# Patient Record
Sex: MALE | Race: OTHER | NOT HISPANIC OR LATINO | URBAN - METROPOLITAN AREA
[De-identification: names, ages, dates, MRNs, and addresses within clinical notes are randomized per-mention and may not be internally consistent; named-entity substitution may affect disease eponyms.]

---

## 2020-03-06 ENCOUNTER — EMERGENCY (EMERGENCY)
Facility: HOSPITAL | Age: 25
LOS: 1 days | Discharge: HOME | End: 2020-03-06
Attending: EMERGENCY MEDICINE
Payer: SELF-PAY

## 2020-03-06 VITALS
TEMPERATURE: 98 F | RESPIRATION RATE: 18 BRPM | HEART RATE: 97 BPM | DIASTOLIC BLOOD PRESSURE: 65 MMHG | OXYGEN SATURATION: 98 % | SYSTOLIC BLOOD PRESSURE: 121 MMHG

## 2020-03-06 VITALS
TEMPERATURE: 98 F | SYSTOLIC BLOOD PRESSURE: 146 MMHG | DIASTOLIC BLOOD PRESSURE: 75 MMHG | RESPIRATION RATE: 16 BRPM | OXYGEN SATURATION: 98 % | HEART RATE: 84 BPM

## 2020-03-06 PROCEDURE — 99285 EMERGENCY DEPT VISIT HI MDM: CPT | Mod: 25

## 2020-03-06 PROCEDURE — 99284 EMERGENCY DEPT VISIT MOD MDM: CPT

## 2020-03-06 PROCEDURE — 99053 MED SERV 10PM-8AM 24 HR FAC: CPT

## 2020-03-06 PROCEDURE — 96372 THER/PROPH/DIAG INJ SC/IM: CPT

## 2020-03-06 RX ORDER — HALOPERIDOL DECANOATE 100 MG/ML
5 INJECTION INTRAMUSCULAR ONCE
Refills: 0 | Status: COMPLETED | OUTPATIENT
Start: 2020-03-06 | End: 2020-03-06

## 2020-03-06 RX ADMIN — HALOPERIDOL DECANOATE 5 MILLIGRAM(S): 100 INJECTION INTRAMUSCULAR at 02:20

## 2020-03-06 NOTE — ED ADULT NURSE NOTE - OBJECTIVE STATEMENT
Patient states his name is Arturo Cerna. EMS picked patient up at a [facility] [employer]. Patient found to be extremely intoxicated. Patient is continuing to try to get out of bed. Medication given per MD order and constant observation requested.

## 2020-03-06 NOTE — ED PROVIDER NOTE - NSFOLLOWUPINSTRUCTIONS_ED_ALL_ED_FT
YOU WERE SEEN IN THE ED FOR: INTOXICATION    PLEASE FOLLOW UP WITH YOUR PRIVATE PHYSICIAN WITHIN THE NEXT 72 HOURS. BRING COPIES OF YOUR RESULTS.  -If you do not have a PMD, please call [phone] to find one convenient for you or call our clinic at [phone].    RETURN TO THE EMERGENCY DEPARTMENT IF YOU EXPERIENCE ANY NEW/CONCERNING/WORSENING SYMPTOMS SUCH AS BUT NOT LIMITED TO: HEADACHE, CHEST PAIN, SHORTNESS OF BREATH, ABDOMINAL PAIN, INTRACTABLE VOMITING OR DIARRHEA, LARGE AMOUNT OF BLEEDING, PASSING OUT, SEIZURES.

## 2020-03-06 NOTE — ED PROVIDER NOTE - PATIENT PORTAL LINK FT
You can access the FollowMyHealth Patient Portal offered by [facility] by registering at the following website: [email or web address]. By joining [facility]’s FollowMyHealth portal, you will also be able to view your health information using other applications (apps) compatible with our system.

## 2020-03-06 NOTE — ED ADULT NURSE NOTE - CHIEF COMPLAINT QUOTE
intox  combative w police pt cooperative at triage intox combative w police pt cooperative at triage

## 2020-03-06 NOTE — ED PROVIDER NOTE - ATTENDING CONTRIBUTION TO CARE
MD Castellanos:  patient seen and evaluated personally.   I agree with the History & Physical,  Impression & Plan other than what was detailed in my note.  MD Castellanos    24 y/o m w /no sig pmh brought in by ems for intoxication, reporting mild ha, no signs of trauma in back of head, clinically intoxicated, denies pain elsewhere. denies n/v. drank 4 locos tonight, denies other drug use. afebrile vitals stable ,appears intoxicated, able to converse, moving all extrem, no signs of trauma to head, no back ttp, well appearing, plan to have metabolize and re evaluate

## 2020-03-06 NOTE — ED PROVIDER NOTE - PHYSICAL EXAMINATION
GENERAL: young male, lying in bed, NAD, following commands. Vital signs are within normal limits  HEENT: NC/AT, conjunctiva noninjected and sclera anicteric, moist mucous membranes, no ecchymosis around eyes or ears. small scratch, nonbleeding on nose.  NECK: Supple, trachea midline  LUNG: CTAB, no w/r/r appreciated, good respiratory effort  CV: RRR, no m/r/g appreciated, Pulses- Radial: 2+ b/l  ABDOMEN: Soft, NTND, no rebound or guarding, no hernias, no pulsatile masses  BACK: No midline or paraspinal tenderness to palpation. No CVA tenderness b/l  MSK: No edema, no visible deformities, full range of motion UE/LE b/l, 5/5 muscle strength UE/LE b/l, nontender extremities. No evidence of trauma.  NEURO: AAOx4 (to person, place, time, event), sensation grossly intact  -Cranial Nerves:  --CN II: PERRL  --CN III, IV, VI: EOMI b/l  --CN V1-3: Facial sensation intact to touch  --CN VII: No facial asymmetry or droop  --CN VIII: Hearing intact to rubbing fingers b/l  --CN IX, X: Palate elevates symmetrically. Normal phonation  --CN XI: Heading turning and shoulder shrug intact b/l  --CN XII: Tongue midline with normal movements  SKIN: Warm, dry, well perfused, no evidence of rash

## 2020-03-06 NOTE — ED PROVIDER NOTE - OBJECTIVE STATEMENT
25 y.o. male BIBEMS to the ED for intoxication. Patient endorsing a headache mainly in back of head without visual changes, neck pain, nausea, vomiting. Denies CP, shortness of breath, abdominal pain, urinary complaints, fevers, chills. Denies anything hurts. States name is Arturo Myers and birthday is July 6th 1996?  States drank 2 bottles of 4 locos. 25 y.o. male BIBEMS to the ED for intoxication. Patient endorsing a headache mainly in back of head without visual changes, neck pain, nausea, vomiting.   Did not take anything for his pain nor does he want anything for his pain. Denies CP, shortness of breath, abdominal pain, urinary complaints, fevers, chills. Denies anything hurts. States name is Arturo Myers and birthday is July 6th 1996? States drank 2 bottles of 4 locos. Denies any other ingestions. 25 y.o. male BIBEMS to the ED for intoxication. Patient endorsing a headache mainly in back of head without visual changes, neck pain, nausea, vomiting.   Did not take anything for his pain nor does he want anything for his pain. Denies CP, shortness of breath, abdominal pain, urinary complaints, fevers, chills. Denies anything else hurts. States name is Arturo Myers and birthday is July 6th 1996? States drank 2 bottles of 4 locos. Denies any other ingestions.

## 2020-03-06 NOTE — ED ADULT NURSE REASSESSMENT NOTE - NS ED NURSE REASSESS COMMENT FT1
0700 Report received from night nurse. Pt awaiting social work consult. Resting quietly. Constant observation intact. Ox4 No c/o pain.

## 2020-03-06 NOTE — ED ADULT NURSE REASSESSMENT NOTE - NS ED NURSE REASSESS COMMENT FT1
[id number]  Bonita knight pt. Metrocard given. Pt ambulating without difficulty. Voided in bathroom.  reevaluated pt. Discharged instructions being written. Pt A&Ox4

## 2020-03-06 NOTE — ED PROVIDER NOTE - CLINICAL SUMMARY MEDICAL DECISION MAKING FREE TEXT BOX
25 y.o. male here for intox. Drank 2 bottles of 4 locos today. Endorses a headache, frontal but does not want anything for symptoms. Vitals wnl. Exam unremarkable, no FND. Patient following commands. No signs of trauma. Will allow patient to MTF, reassess.

## 2020-03-06 NOTE — ED PROVIDER NOTE - PROGRESS NOTE DETAILS
Warren Esquivel D.O., PGY1 (Resident)  On clarification, patient endorsed birthday was 07/09/1989. Patient now denies headache. Due to increasing agitation, will give haldol 5mg for patient and staff safety Warren Esquivel D.O., PGY1 (Resident)  Patient able to ambulate, tolerating PO. Wants to walk home but lives in [facility]. Will await SW. received s/o on pt. Patient reassessed, NAD, non-toxic appearing. ambulating. aaox4. manny Orozco D.O. PGY2

## 2020-03-06 NOTE — CHART NOTE - NSCHARTNOTEFT_GEN_A_CORE
ED SW: Patient BIB by Police for intoxication. In ED, patient was combative and agitated. Patient treated with Haldol 5mg. Patient now medically cleared for discharge-no further events of agitation. LMSW met with patient at bedside for discharge planning. Patient is Indian speaking. [facility]  ID #[id number] assisted with confirming demographics. Patient identified himself as Steven Cerna.  1989. Patient reports he resides with his sister at [address] [place] [address]. Patient undocumented/uninsured. Patient counseled on the health risks of excessive alcohol use. Patient reports he typically drinks 2-3 beers/2-3x a week. Per patient, he does not typically consume alcohol in excess. Patient requesting assist with transportation on discharge. Patient offered and accepted metrocard for travel. Patient provided with printed directions in Indian for travel. Patient independent in all levels of functioning, steady gait, and dressed appropriately for the weather. No further SW needs indicated.     Patient with duplicate MRN [id number] Rep informed.

## 2024-09-07 NOTE — ED PROVIDER NOTE - CARE PLAN
Previously Declined (within the last year) Principal Discharge DX:	Alcoholic intoxication without complication